# Patient Record
Sex: FEMALE | Race: WHITE | NOT HISPANIC OR LATINO | URBAN - NONMETROPOLITAN AREA
[De-identification: names, ages, dates, MRNs, and addresses within clinical notes are randomized per-mention and may not be internally consistent; named-entity substitution may affect disease eponyms.]

---

## 2019-03-28 ENCOUNTER — IMPORTED ENCOUNTER (OUTPATIENT)
Dept: URBAN - NONMETROPOLITAN AREA CLINIC 1 | Facility: CLINIC | Age: 64
End: 2019-03-28

## 2019-03-28 PROBLEM — E11.9: Noted: 2019-03-28

## 2019-03-28 PROBLEM — H52.03: Noted: 2019-03-28

## 2019-03-28 PROCEDURE — 92015 DETERMINE REFRACTIVE STATE: CPT

## 2019-03-28 PROCEDURE — 92004 COMPRE OPH EXAM NEW PT 1/>: CPT

## 2019-03-28 NOTE — PATIENT DISCUSSION
DM s -Stressed the importance of keeping blood sugars under control blood pressure under control and weight normalization and regular visits with PCP. -Explained the possible effects of poorly controlled diabetes and the damage that diabetes can cause to ocular health. -Patient to check HgbA1C.-Pt instructed to contact our office with any vision changes. Hyperopia-Discussed diagnosis with patient. Updated spec Rx given.   Recommend lens that will provide comfort as well as protect safety and health of eyes.; Dr's Notes: [facility]

## 2019-06-17 ENCOUNTER — IMPORTED ENCOUNTER (OUTPATIENT)
Dept: URBAN - NONMETROPOLITAN AREA CLINIC 1 | Facility: CLINIC | Age: 64
End: 2019-06-17

## 2019-06-17 PROBLEM — H25.13: Noted: 2019-06-17

## 2019-06-17 PROBLEM — E11.9: Noted: 2019-03-28

## 2019-06-17 PROBLEM — H52.03: Noted: 2019-03-28

## 2019-06-17 NOTE — PATIENT DISCUSSION
DM s -Stressed the importance of keeping blood sugars under control blood pressure under control and weight normalization and regular visits with PCP. -Explained the possible effects of poorly controlled diabetes and the damage that diabetes can cause to ocular health. -Patient to check HgbA1C.-Pt instructed to contact our office with any vision changes. Hyperopia-Discussed diagnosis with patient. Updated spec Rx given. Recommend lens that will provide comfort as well as protect safety and health of eyes. Cataract(s)-Visually significant.-Cataract(s) causing symptomatic impairment of visual function not correctable with a tolerable change in glasses or contact lenses lighting or non-operative means resulting in specific activity limitations and/or participation restrictions including but not limited to reading viewing television driving or meeting vocational or recreational needs. -Expectation is clearer vision and reduced glare disability after cataract removal.-Refer to Dr Randy Galicia for cataract evaluation; 's Notes: [facility]

## 2021-05-10 ENCOUNTER — IMPORTED ENCOUNTER (OUTPATIENT)
Dept: URBAN - NONMETROPOLITAN AREA CLINIC 1 | Facility: CLINIC | Age: 66
End: 2021-05-10

## 2021-05-10 PROBLEM — H40.1132: Noted: 2021-05-10

## 2021-05-10 PROBLEM — H26.493: Noted: 2021-05-10

## 2021-05-10 PROBLEM — Z96.1: Noted: 2021-05-10

## 2021-05-10 PROBLEM — E11.9: Noted: 2021-05-10

## 2021-05-10 PROCEDURE — 92014 COMPRE OPH EXAM EST PT 1/>: CPT

## 2021-05-10 NOTE — PATIENT DISCUSSION
DM s DR-Stressed the importance of keeping blood sugars under control blood pressure under control and weight normalization and regular visits with PCP. -Explained the possible effects of poorly controlled diabetes and the damage that diabetes can cause to ocular health. -Patient to check HgbA1C.-Pt instructed to contact our office with any vision changes. POAGs/p SLT Discussed w/pt IOP 18:17 05/10/21Continue Lumigan qhs OU written Rx givenOrder VF Continue to monitorWill get records from [address] [place] [address] noted continue to monitor; Dr's Notes: [facility]
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
## 2022-01-27 ENCOUNTER — ESTABLISHED PATIENT (OUTPATIENT)
Dept: RURAL CLINIC 1 | Facility: CLINIC | Age: 67
End: 2022-01-27

## 2022-01-27 DIAGNOSIS — Z96.1: ICD-10-CM

## 2022-01-27 DIAGNOSIS — H40.1132: ICD-10-CM

## 2022-01-27 DIAGNOSIS — Z98.890: ICD-10-CM

## 2022-01-27 DIAGNOSIS — H26.493: ICD-10-CM

## 2022-01-27 PROCEDURE — 92012 INTRM OPH EXAM EST PATIENT: CPT

## 2022-01-27 ASSESSMENT — VISUAL ACUITY
OS_CC: 20/30
OD_CC: 20/40

## 2022-01-27 ASSESSMENT — TONOMETRY
OD_IOP_MMHG: 18
OS_IOP_MMHG: 18

## 2022-04-09 ASSESSMENT — VISUAL ACUITY
OS_GLARE: 20/30
OS_SC: 20/20-2
OS_CC: 20/80-1
OD_SC: 20/30
OD_GLARE: 20/30
OD_SC: 20/20
OD_SC: 20/25-2
OU_CC: J1+
OS_SC: 20/30
OS_SC: 20/40

## 2022-04-09 ASSESSMENT — TONOMETRY
OS_IOP_MMHG: 18
OD_IOP_MMHG: 18
OD_IOP_MMHG: 18
OS_IOP_MMHG: 17

## 2022-07-25 ENCOUNTER — ESTABLISHED PATIENT (OUTPATIENT)
Dept: RURAL CLINIC 1 | Facility: CLINIC | Age: 67
End: 2022-07-25

## 2022-07-25 DIAGNOSIS — H40.1132: ICD-10-CM

## 2022-07-25 PROCEDURE — 92014 COMPRE OPH EXAM EST PT 1/>: CPT

## 2022-07-25 PROCEDURE — 92133 CPTRZD OPH DX IMG PST SGM ON: CPT

## 2022-07-25 ASSESSMENT — VISUAL ACUITY
OS_BAT: 20/25
OD_SC: 20/50+2
OS_CC: 20/25+2
OD_CC: 20/50-1
OD_BAT: 20/30
OU_SC: 20/50-1
OS_SC: 20/200
OD_PH: 20/30
OU_CC: 20/25-1
OU_CC: 20/30

## 2022-07-25 ASSESSMENT — TONOMETRY
OD_IOP_MMHG: 20
OD_IOP_MMHG: 18
OS_IOP_MMHG: 23

## 2022-07-25 NOTE — PATIENT DISCUSSION
Discussed the risks/benefits of YAG Laser Capsulotomy. Patient will come back in one month for PCO eval OU.

## 2022-08-25 ENCOUNTER — CONSULTATION/EVALUATION (OUTPATIENT)
Dept: RURAL CLINIC 1 | Facility: CLINIC | Age: 67
End: 2022-08-25

## 2022-08-25 DIAGNOSIS — H40.1132: ICD-10-CM

## 2022-08-25 DIAGNOSIS — Z96.1: ICD-10-CM

## 2022-08-25 DIAGNOSIS — H26.493: ICD-10-CM

## 2022-08-25 DIAGNOSIS — Z98.890: ICD-10-CM

## 2022-08-25 PROCEDURE — 92020 GONIOSCOPY: CPT

## 2022-08-25 PROCEDURE — 99214 OFFICE O/P EST MOD 30 MIN: CPT

## 2022-08-25 ASSESSMENT — VISUAL ACUITY
OS_CC: 20/25
OS_BAT: 20/20
OU_CC: 20/25
OD_CC: 20/30
OU_CC: 20/25
OD_BAT: 20/25

## 2022-08-25 ASSESSMENT — TONOMETRY
OD_IOP_MMHG: 21
OS_IOP_MMHG: 24
OS_IOP_MMHG: 25
OD_IOP_MMHG: 23

## 2022-08-25 NOTE — PATIENT DISCUSSION
Recommend patient have SLT OU done to help with the pressures. Will plan to do at next exam. Doing the OS first then OD.

## 2022-09-01 ENCOUNTER — CLINIC PROCEDURE ONLY (OUTPATIENT)
Dept: RURAL CLINIC 1 | Facility: CLINIC | Age: 67
End: 2022-09-01

## 2022-09-01 DIAGNOSIS — H40.1132: ICD-10-CM

## 2022-09-01 PROCEDURE — 65855 TRABECULOPLASTY LASER SURG: CPT

## 2022-09-01 ASSESSMENT — TONOMETRY
OS_IOP_MMHG: 25
OD_IOP_MMHG: 19

## 2022-09-01 ASSESSMENT — VISUAL ACUITY
OS_CC: 20/25+1
OD_CC: 20/25

## 2022-09-01 NOTE — PROCEDURE NOTE: CLINICAL
PROCEDURE NOTE: SLT OS. Diagnosis: POAG, Moderate. Anesthesia: Topical. Prep: Betadine Flush. Prior to treatment, the risks/benefits/alternatives were discussed. The patient wished to proceed with procedure. Lens:  SLT laser lens with goniosol. Power: *45 spots at 0.9mJ per spot. Patient tolerated procedure well. There were no complications. Post-op instructions given. Post-op IOP = * mmHg. Queen Jhoan

## 2022-09-08 ENCOUNTER — CLINIC PROCEDURE ONLY (OUTPATIENT)
Dept: RURAL CLINIC 1 | Facility: CLINIC | Age: 67
End: 2022-09-08

## 2022-09-08 DIAGNOSIS — H40.1132: ICD-10-CM

## 2022-09-08 PROCEDURE — 65855 TRABECULOPLASTY LASER SURG: CPT

## 2022-09-08 ASSESSMENT — VISUAL ACUITY
OD_CC: 20/20
OU_CC: 20/20
OS_CC: 20/25
OU_CC: 20/20
OD_CC: 20/40
OS_CC: 20/20

## 2022-09-08 ASSESSMENT — TONOMETRY
OS_IOP_MMHG: 17
OD_IOP_MMHG: 17

## 2022-09-08 NOTE — PROCEDURE NOTE: CLINICAL
PROCEDURE NOTE: SLT OD. Diagnosis: POAG, Moderate. Anesthesia: Topical. Prep: Betadine Flush. Prior to treatment, the risks/benefits/alternatives were discussed. The patient wished to proceed with procedure. Lens:  SLT laser lens with goniosol. Power: 46 spots @ 0.9 mJ. Patient tolerated procedure well. There were no complications. Post-op instructions given. Post-op IOP = * mmHg. Queen [place]

## 2022-10-06 ENCOUNTER — POST-OP (OUTPATIENT)
Dept: RURAL CLINIC 1 | Facility: CLINIC | Age: 67
End: 2022-10-06

## 2022-10-06 DIAGNOSIS — H40.1132: ICD-10-CM

## 2022-10-06 DIAGNOSIS — Z98.890: ICD-10-CM

## 2022-10-06 PROCEDURE — 99024 POSTOP FOLLOW-UP VISIT: CPT

## 2022-10-06 ASSESSMENT — VISUAL ACUITY
OD_CC: 20/30
OS_CC: 20/30
OU_CC: 20/30
OU_CC: 20/30
OS_CC: 20/30
OD_CC: 20/40

## 2022-10-06 ASSESSMENT — TONOMETRY
OD_IOP_MMHG: 17
OS_IOP_MMHG: 17
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
## 2022-10-17 ENCOUNTER — FOLLOW UP (OUTPATIENT)
Dept: RURAL CLINIC 1 | Facility: CLINIC | Age: 67
End: 2022-10-17

## 2022-10-17 DIAGNOSIS — H26.493: ICD-10-CM

## 2022-10-17 PROCEDURE — 66821 AFTER CATARACT LASER SURGERY: CPT

## 2022-10-17 PROCEDURE — 92014 COMPRE OPH EXAM EST PT 1/>: CPT

## 2022-10-17 ASSESSMENT — TONOMETRY
OD_IOP_MMHG: 19
OS_IOP_MMHG: 23
OD_IOP_MMHG: 23
OS_IOP_MMHG: 19

## 2022-10-17 ASSESSMENT — VISUAL ACUITY
OD_BAT: 20/40-1
OU_CC: 20/25
OS_CC: 20/25-1
OD_CC: 20/30
OU_CC: 20/30
OS_BAT: 20/25-2

## 2022-10-17 NOTE — PROCEDURE NOTE: CLINICAL
PROCEDURE NOTE: YAG Capsulotomy OD. Diagnosis: Other Secondary Cataract. Anesthesia: Topical. The purpose and nature of the procedure, possible alternative methods of treatment, the risks involved and the possibility of complications were discussed with patient. The Patient wishes to proceed and the consent was signed. 1 gtt Prolensa applied. The laser was then performed under topical anesthesia with no complications. Post op instructions were given to patient as well as a follow-up appointment. Patient was advised to call our office if any questions or concerns.  59 spots @ 1.9 mj, power increased for denser areas 2.2 mj.

## 2022-11-21 ENCOUNTER — POST-OP (OUTPATIENT)
Dept: RURAL CLINIC 1 | Facility: CLINIC | Age: 67
End: 2022-11-21

## 2022-11-21 DIAGNOSIS — Z98.890: ICD-10-CM

## 2022-11-21 PROCEDURE — 99024 POSTOP FOLLOW-UP VISIT: CPT

## 2022-11-21 ASSESSMENT — VISUAL ACUITY
OD_CC: 20/25
OD_CC: 20/30
OS_CC: 20/20
OS_CC: 20/25
OU_CC: 20/20
OU_CC: 20/25

## 2022-11-21 ASSESSMENT — TONOMETRY
OD_IOP_MMHG: 17
OS_IOP_MMHG: 17

## 2023-07-12 ENCOUNTER — FOLLOW UP (OUTPATIENT)
Dept: RURAL CLINIC 1 | Facility: CLINIC | Age: 68
End: 2023-07-12

## 2023-07-12 DIAGNOSIS — H02.882: ICD-10-CM

## 2023-07-12 DIAGNOSIS — H26.492: ICD-10-CM

## 2023-07-12 DIAGNOSIS — E11.9: ICD-10-CM

## 2023-07-12 DIAGNOSIS — Z98.890: ICD-10-CM

## 2023-07-12 DIAGNOSIS — H02.885: ICD-10-CM

## 2023-07-12 DIAGNOSIS — H40.1132: ICD-10-CM

## 2023-07-12 DIAGNOSIS — Z96.1: ICD-10-CM

## 2023-07-12 PROCEDURE — 92014 COMPRE OPH EXAM EST PT 1/>: CPT

## 2023-07-12 PROCEDURE — 92020 GONIOSCOPY: CPT

## 2023-07-12 ASSESSMENT — TONOMETRY
OS_IOP_MMHG: 19
OD_IOP_MMHG: 18

## 2023-07-12 ASSESSMENT — VISUAL ACUITY
OS_CC: 20/60-1
OD_CC: 20/40
OU_CC: 20/30

## 2023-08-28 ENCOUNTER — EMERGENCY VISIT (OUTPATIENT)
Dept: RURAL CLINIC 1 | Facility: CLINIC | Age: 68
End: 2023-08-28

## 2023-08-28 DIAGNOSIS — H52.4: ICD-10-CM

## 2023-08-28 DIAGNOSIS — H16.223: ICD-10-CM

## 2023-08-28 DIAGNOSIS — H40.1132: ICD-10-CM

## 2023-08-28 DIAGNOSIS — H26.492: ICD-10-CM

## 2023-08-28 DIAGNOSIS — E11.9: ICD-10-CM

## 2023-08-28 PROCEDURE — 92015 DETERMINE REFRACTIVE STATE: CPT

## 2023-08-28 PROCEDURE — 99214 OFFICE O/P EST MOD 30 MIN: CPT

## 2023-08-28 ASSESSMENT — TONOMETRY
OS_IOP_MMHG: 21
OD_IOP_MMHG: 19

## 2023-08-28 ASSESSMENT — VISUAL ACUITY
OS_CC: 20/30-2
OD_CC: 20/30

## 2023-11-15 ENCOUNTER — FOLLOW UP (OUTPATIENT)
Dept: RURAL CLINIC 1 | Facility: CLINIC | Age: 68
End: 2023-11-15

## 2023-11-15 DIAGNOSIS — H40.1132: ICD-10-CM

## 2023-11-15 DIAGNOSIS — E11.9: ICD-10-CM

## 2023-11-15 DIAGNOSIS — H26.492: ICD-10-CM

## 2023-11-15 DIAGNOSIS — Z96.1: ICD-10-CM

## 2023-11-15 DIAGNOSIS — H16.223: ICD-10-CM

## 2023-11-15 PROCEDURE — 92083 EXTENDED VISUAL FIELD XM: CPT

## 2023-11-15 PROCEDURE — 99214 OFFICE O/P EST MOD 30 MIN: CPT

## 2023-11-15 ASSESSMENT — TONOMETRY
OS_IOP_MMHG: 17
OD_IOP_MMHG: 17

## 2023-11-15 ASSESSMENT — VISUAL ACUITY
OS_CC: 20/30+2
OD_CC: 20/40+1

## 2024-03-12 ENCOUNTER — FOLLOW UP (OUTPATIENT)
Dept: RURAL CLINIC 1 | Facility: CLINIC | Age: 69
End: 2024-03-12

## 2024-03-12 DIAGNOSIS — H40.1132: ICD-10-CM

## 2024-03-12 DIAGNOSIS — H26.492: ICD-10-CM

## 2024-03-12 DIAGNOSIS — Z96.1: ICD-10-CM

## 2024-03-12 DIAGNOSIS — H16.223: ICD-10-CM

## 2024-03-12 DIAGNOSIS — E11.9: ICD-10-CM

## 2024-03-12 PROCEDURE — 92014 COMPRE OPH EXAM EST PT 1/>: CPT

## 2024-03-12 PROCEDURE — 92020 GONIOSCOPY: CPT

## 2024-03-12 ASSESSMENT — TONOMETRY
OD_IOP_MMHG: 24
OS_IOP_MMHG: 24

## 2024-03-12 ASSESSMENT — VISUAL ACUITY
OS_CC: 20/25-2
OD_CC: 20/30+2

## 2024-06-03 ENCOUNTER — EMERGENCY VISIT (OUTPATIENT)
Dept: RURAL CLINIC 1 | Facility: CLINIC | Age: 69
End: 2024-06-03

## 2024-06-03 DIAGNOSIS — E11.9: ICD-10-CM

## 2024-06-03 DIAGNOSIS — H16.223: ICD-10-CM

## 2024-06-03 DIAGNOSIS — H10.13: ICD-10-CM

## 2024-06-03 DIAGNOSIS — Z96.1: ICD-10-CM

## 2024-06-03 DIAGNOSIS — H40.1132: ICD-10-CM

## 2024-06-03 DIAGNOSIS — H26.492: ICD-10-CM

## 2024-06-03 PROCEDURE — 99213 OFFICE O/P EST LOW 20 MIN: CPT

## 2024-06-03 ASSESSMENT — VISUAL ACUITY
OS_CC: 20/40
OD_CC: 20/30

## 2024-07-16 ENCOUNTER — FOLLOW UP (OUTPATIENT)
Dept: RURAL CLINIC 1 | Facility: CLINIC | Age: 69
End: 2024-07-16

## 2024-07-16 DIAGNOSIS — H26.492: ICD-10-CM

## 2024-07-16 DIAGNOSIS — Z96.1: ICD-10-CM

## 2024-07-16 DIAGNOSIS — H40.1131: ICD-10-CM

## 2024-07-16 DIAGNOSIS — H16.223: ICD-10-CM

## 2024-07-16 DIAGNOSIS — E11.9: ICD-10-CM

## 2024-07-16 PROCEDURE — 92083 EXTENDED VISUAL FIELD XM: CPT

## 2024-07-16 PROCEDURE — 99214 OFFICE O/P EST MOD 30 MIN: CPT

## 2024-07-16 ASSESSMENT — TONOMETRY
OS_IOP_MMHG: 18
OD_IOP_MMHG: 18

## 2024-07-16 ASSESSMENT — VISUAL ACUITY
OS_CC: 20/20
OD_CC: 20/30
OU_CC: 20/25
OU_CC: 20/25
OS_CC: 20/25
OD_CC: 20/25

## 2024-08-08 ENCOUNTER — CONTACT LENSES/GLASSES VISIT (OUTPATIENT)
Dept: RURAL CLINIC 1 | Facility: CLINIC | Age: 69
End: 2024-08-08

## 2024-08-08 DIAGNOSIS — H40.1131: ICD-10-CM

## 2024-08-08 PROCEDURE — 92015 DETERMINE REFRACTIVE STATE: CPT | Mod: NC

## 2024-11-12 ENCOUNTER — FOLLOW UP (OUTPATIENT)
Dept: RURAL CLINIC 1 | Facility: CLINIC | Age: 69
End: 2024-11-12

## 2024-11-12 DIAGNOSIS — H16.223: ICD-10-CM

## 2024-11-12 DIAGNOSIS — Z96.1: ICD-10-CM

## 2024-11-12 DIAGNOSIS — E11.9: ICD-10-CM

## 2024-11-12 DIAGNOSIS — H40.1131: ICD-10-CM

## 2024-11-12 DIAGNOSIS — H26.492: ICD-10-CM

## 2024-11-12 PROCEDURE — 92014 COMPRE OPH EXAM EST PT 1/>: CPT

## 2025-03-13 ENCOUNTER — FOLLOW UP (OUTPATIENT)
Age: 70
End: 2025-03-13

## 2025-03-13 DIAGNOSIS — H16.223: ICD-10-CM

## 2025-03-13 DIAGNOSIS — H26.492: ICD-10-CM

## 2025-03-13 DIAGNOSIS — E11.9: ICD-10-CM

## 2025-03-13 DIAGNOSIS — H40.1131: ICD-10-CM

## 2025-03-13 PROCEDURE — 92133 CPTRZD OPH DX IMG PST SGM ON: CPT

## 2025-03-13 PROCEDURE — 99213 OFFICE O/P EST LOW 20 MIN: CPT

## 2025-07-03 ENCOUNTER — EMERGENCY VISIT (OUTPATIENT)
Age: 70
End: 2025-07-03

## 2025-07-03 DIAGNOSIS — E11.9: ICD-10-CM

## 2025-07-03 DIAGNOSIS — H43.393: ICD-10-CM

## 2025-07-03 DIAGNOSIS — H26.492: ICD-10-CM

## 2025-07-03 DIAGNOSIS — H16.223: ICD-10-CM

## 2025-07-03 DIAGNOSIS — H40.1131: ICD-10-CM

## 2025-07-03 DIAGNOSIS — Z96.1: ICD-10-CM

## 2025-07-03 PROCEDURE — 92014 COMPRE OPH EXAM EST PT 1/>: CPT

## 2025-07-03 PROCEDURE — 92020 GONIOSCOPY: CPT
